# Patient Record
Sex: FEMALE | Race: WHITE | NOT HISPANIC OR LATINO
[De-identification: names, ages, dates, MRNs, and addresses within clinical notes are randomized per-mention and may not be internally consistent; named-entity substitution may affect disease eponyms.]

---
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
## 2021-08-04 PROBLEM — K58.9 IRRITABLE BOWEL SYNDROME WITHOUT DIARRHEA: Chronic | Status: ACTIVE | Noted: 2020-10-18

## 2021-08-20 ENCOUNTER — TRANSCRIPTION ENCOUNTER (OUTPATIENT)
Age: 21
End: 2021-08-20

## 2021-10-07 DIAGNOSIS — Z30.41 ENCOUNTER FOR SURVEILLANCE OF CONTRACEPTIVE PILLS: ICD-10-CM

## 2021-10-08 RX ORDER — NORETHINDRONE ACETATE AND ETHINYL ESTRADIOL 1MG-20(24)
1-20 KIT ORAL
Qty: 3 | Refills: 0 | Status: ACTIVE | COMMUNITY
Start: 2021-10-07 | End: 1900-01-01

## 2021-11-13 ENCOUNTER — APPOINTMENT (OUTPATIENT)
Dept: OBGYN | Facility: CLINIC | Age: 21
End: 2021-11-13

## 2021-12-22 ENCOUNTER — APPOINTMENT (OUTPATIENT)
Dept: OBGYN | Facility: CLINIC | Age: 21
End: 2021-12-22
Payer: COMMERCIAL

## 2021-12-22 VITALS
HEIGHT: 61 IN | SYSTOLIC BLOOD PRESSURE: 118 MMHG | DIASTOLIC BLOOD PRESSURE: 80 MMHG | BODY MASS INDEX: 20.77 KG/M2 | WEIGHT: 110 LBS

## 2021-12-22 DIAGNOSIS — Z00.00 ENCOUNTER FOR GENERAL ADULT MEDICAL EXAMINATION W/OUT ABNORMAL FINDINGS: ICD-10-CM

## 2021-12-22 PROCEDURE — 99395 PREV VISIT EST AGE 18-39: CPT

## 2021-12-22 PROCEDURE — 87081 CULTURE SCREEN ONLY: CPT

## 2021-12-22 PROCEDURE — 36415 COLL VENOUS BLD VENIPUNCTURE: CPT

## 2021-12-22 NOTE — PLAN
[FreeTextEntry1] : #HCM\par -Breast self exam reviewed and taught\par -STI Screening today with GC/CT cultures, HIV, RPR, Hep B/C\par -Pap due at 23 yo\par -Immunizations: UTD\par \par #contraception\par -Blisovi re-prescribed\par \par #h/o ovarian cysts\par -physcial exam WNL\par -Asymptomatic\par -repeat TVUS to confirm resolution. \par \par RTO in 1 year for annual GYN exam or PRN for any GYN complaints\par GWEN Carreon MD\par

## 2021-12-22 NOTE — HISTORY OF PRESENT ILLNESS
[Normal Amount/Duration] :  normal amount and duration [Regular Cycle Intervals] : periods have been regular [Frequency: Q ___ days] : menstrual periods occur approximately every [unfilled] days [Menarche Age: ____] : age at menarche was [unfilled] [No] : Patient does not have concerns regarding sex [Men] : men [Vaginal] : vaginal [Oral] : oral [Yes] : Yes [Condoms] : Condoms [Patient would like to be screened for STIs] : Patient would like to be screened for STIs [FreeTextEntry1] : 11/30/21 [FreeTextEntry3] : [facility]

## 2021-12-23 LAB
HBV SURFACE AG SER QL: NONREACTIVE
HCV AB SER QL: NONREACTIVE
HCV S/CO RATIO: 0.12 S/CO
HIV1+2 AB SPEC QL IA.RAPID: NONREACTIVE
T PALLIDUM AB SER QL IA: NEGATIVE

## 2021-12-26 LAB
C TRACH RRNA SPEC QL NAA+PROBE: NOT DETECTED
N GONORRHOEA RRNA SPEC QL NAA+PROBE: NOT DETECTED
SOURCE AMPLIFICATION: NORMAL

## 2022-01-11 NOTE — ED ADULT NURSE NOTE - NS ED NURSE LEVEL OF CONSCIOUSNESS AFFECT
Impression: Age-related nuclear cataract, bilateral: H25.13. Plan: Discussed diagnosis with patient. Cataract evaluation is recommended. Pt wishes to proceed. Informed care giver that we are unable to perform the cataract surgery here. Recommend care giver find an office where patient can be under anesthesia for surgery.
Appropriate

## 2022-02-18 NOTE — ED PROVIDER NOTE - SKIN, MLM
Pre-Hospital Care Report (PCR) Skin normal color for race, warm, dry and intact. No evidence of rash.

## 2022-08-10 ENCOUNTER — APPOINTMENT (OUTPATIENT)
Dept: ULTRASOUND IMAGING | Facility: CLINIC | Age: 22
End: 2022-08-10

## 2022-08-10 ENCOUNTER — APPOINTMENT (OUTPATIENT)
Dept: OBGYN | Facility: CLINIC | Age: 22
End: 2022-08-10

## 2022-08-10 ENCOUNTER — OUTPATIENT (OUTPATIENT)
Dept: OUTPATIENT SERVICES | Facility: HOSPITAL | Age: 22
LOS: 1 days | End: 2022-08-10
Payer: COMMERCIAL

## 2022-08-10 VITALS — SYSTOLIC BLOOD PRESSURE: 98 MMHG | HEIGHT: 61 IN | DIASTOLIC BLOOD PRESSURE: 60 MMHG

## 2022-08-10 DIAGNOSIS — R10.32 LEFT LOWER QUADRANT PAIN: ICD-10-CM

## 2022-08-10 DIAGNOSIS — R10.2 PELVIC AND PERINEAL PAIN: ICD-10-CM

## 2022-08-10 LAB — HCG UR QL: NEGATIVE

## 2022-08-10 PROCEDURE — 76830 TRANSVAGINAL US NON-OB: CPT | Mod: 26

## 2022-08-10 PROCEDURE — 76830 TRANSVAGINAL US NON-OB: CPT

## 2022-08-10 PROCEDURE — 99213 OFFICE O/P EST LOW 20 MIN: CPT | Mod: 25

## 2022-08-10 PROCEDURE — 81025 URINE PREGNANCY TEST: CPT

## 2022-08-10 NOTE — HISTORY OF PRESENT ILLNESS
[FreeTextEntry1] : 21 yo, LMP: 8/9/22. Pt has a h/o ovarian cysts. Pain started 3 days ago that feels like dull pain with. It started to feel sharp. Denies taking OTC pain medication and used topical castor oil. \par \par Office UPT neg [TextBox_4] : 2

## 2022-08-10 NOTE — PHYSICAL EXAM
[Appropriately responsive] : appropriately responsive [Alert] : alert [No Acute Distress] : no acute distress [Soft] : soft [Non-tender] : non-tender [Non-distended] : non-distended [Labia Majora] : normal [Labia Minora] : normal [Normal] : normal [Uterine Adnexae] : normal

## 2022-08-10 NOTE — PLAN
[FreeTextEntry1] : #LLQ pain\par ddx ruptured ovarian cyst vs. dysmenorrhea vs. ovarian torsion. less likely ovarian torsion given benign abdominal and pelvic exam\par will expedite TVUS at radiology location\par pt given appt for imaging.\par torsion precautions reviewed\par \par Will call pt when reports results\par YANELY Carreon MD

## 2022-08-11 ENCOUNTER — TRANSCRIPTION ENCOUNTER (OUTPATIENT)
Age: 22
End: 2022-08-11

## 2022-09-09 NOTE — ED ADULT NURSE NOTE - PLAN OF CARE
Rifampin Counseling: I discussed with the patient the risks of rifampin including but not limited to liver damage, kidney damage, red-orange body fluids, nausea/vomiting and severe allergy. Call bell/Explanation of exam/test

## 2022-11-01 RX ORDER — NORETHINDRONE ACETATE AND ETHINYL ESTRADIOL 1MG-20(24)
1-20 KIT ORAL
Qty: 3 | Refills: 0 | Status: ACTIVE | COMMUNITY
Start: 2021-12-22 | End: 1900-01-01

## 2022-12-28 ENCOUNTER — APPOINTMENT (OUTPATIENT)
Dept: OBGYN | Facility: CLINIC | Age: 22
End: 2022-12-28

## 2022-12-28 VITALS
HEIGHT: 61 IN | HEART RATE: 83 BPM | SYSTOLIC BLOOD PRESSURE: 107 MMHG | WEIGHT: 120 LBS | DIASTOLIC BLOOD PRESSURE: 72 MMHG | BODY MASS INDEX: 22.66 KG/M2

## 2022-12-28 DIAGNOSIS — Z30.9 ENCOUNTER FOR CONTRACEPTIVE MANAGEMENT, UNSPECIFIED: ICD-10-CM

## 2022-12-28 DIAGNOSIS — Z01.419 ENCOUNTER FOR GYNECOLOGICAL EXAMINATION (GENERAL) (ROUTINE) W/OUT ABNORMAL FINDINGS: ICD-10-CM

## 2022-12-28 PROCEDURE — 36415 COLL VENOUS BLD VENIPUNCTURE: CPT

## 2022-12-28 PROCEDURE — 99395 PREV VISIT EST AGE 18-39: CPT

## 2022-12-28 NOTE — PLAN
[FreeTextEntry1] : #HCM\par -Breast self exam reviewed and taught\par -STI Screening today with GC/CT cultures, HIV, RPR, Hep B/C\par -Pap today\par -Immunizations: UTD\par \par #BTB after vomiting 1 pill\par -pt advised to use 2nd form of birth control like condoms\par -reassurance given\par \par #contraception\par -refill for blisovi rx'd\par \par RTO in 1 year for annual GYN exam or PRN for any GYN complaints\par GWEN Carreon MD

## 2022-12-28 NOTE — HISTORY OF PRESENT ILLNESS
[Menarche Age: ____] : age at menarche was [unfilled] [No] : Patient does not have concerns regarding sex [Currently Active] : currently active [Patient would like to be screened for STIs] : Patient would like to be screened for STIs [FreeTextEntry1] : 12/23/22 [FreeTextEntry3] : OCPs

## 2022-12-29 LAB
C TRACH RRNA SPEC QL NAA+PROBE: NOT DETECTED
HBV SURFACE AG SER QL: NONREACTIVE
HCV AB SER QL: NONREACTIVE
HCV S/CO RATIO: 0.07 S/CO
HIV1+2 AB SPEC QL IA.RAPID: NONREACTIVE
N GONORRHOEA RRNA SPEC QL NAA+PROBE: NOT DETECTED
SOURCE TP AMPLIFICATION: NORMAL
T PALLIDUM AB SER QL IA: NEGATIVE

## 2023-01-09 ENCOUNTER — NON-APPOINTMENT (OUTPATIENT)
Age: 23
End: 2023-01-09

## 2023-12-22 NOTE — ED ADULT NURSE NOTE - ILLNESS RECENT EXPOSURE
MEDICAL ICU PROGRESS NOTE  12/22/2023      Date of Service (when I saw the patient): 12/22/2023    ASSESSMENT: Jarett Goldman is a 75 year old male admitted on 12/10/2023 who is being transferred out of the ICU on 12/13/2023. He has a history of significant for CAD, s/p CABGx3, severe HFrEF (EF 34%), ESRD on dialysis (last run one week prior to admit) who was initially admitted on 12/11 for shortness of breath after missing dialysis, during initial dialysis that she became bradycardic, hypotensive with altered mental status, requiring ICU transfer.  He required transcutaneous pacing prior to initiating epinephrine drip, now off drips since 12/12 1800, transferred out of the ICU on 12/13,was on floor awaiting TCU placement when code blue called due to acute encephalopathy and hypotension brought to the ICU for management of hypotension, shock, and further monitoring. Currently off pressors and hemodynamically stable with resolving encephalopathy. Plan for extubation today.     CHANGES and MAJOR THINGS TODAY:   - SBT eval extubation readiness- trial extubation this morning  - Cont SDS taper; hydrocortisone 50 mg daily today then off tomorrow  - SLP consult for swallow study- NPO for now  - Continue Zosyn for 5 day course  - ABG q6h after extubation     PLAN:    Neuro:  # Pain and sedation  - Acetaminophen 675 mg PO PRN   - RASS goal -1 to 0     # Acute encephalopathy- unknown etiology c/f infectious versus hypoperfusion with potential GIB- improving  # Hx CVA 2021 bilateral L>R cerebellar hemisphere, L occipital lobe (thought to be cardioembolic 2/2 ALIYAH thrombus, residual R homonymous hemianopsia)  # Hx CVA 2015 involving R cerebellar hemisphere  12/11 Minimally responsive during period of hypotension and bradycardia following one hour run of dialysis. Code stroke called, however, patient too hemodynamically unstable for CT imaging at time of event. AMS likely 2/2 cardiogenic syncope, however given hx of CVA will  obtain repeat imaging once hemodynamically stable. With improved HR and BP patient became more alert and responsive, moving all extremities with no focal neuro deficit. On 12/20, developed new encephalopathy and code blue called. Head CT and CTA Head neck w/ contrast without acute intracranial findings. Neurocrit evaluated him who said low likelihood of neurologic etiology for his encephalopathy. No seizure-like activity. Given his hypotensive pressures and c/f GID, high likelihood of hypoperfusion and/or infectious given rising leukocytosis. No known new medications or ingestions.   - Restart ASA  - Restart Eliquis      Pulmonary:  # Intubated for airway protection  Given obtunded state and concern for ability to protect his airway, intubated on 12/20. Previously on RA on the floor. Remains on ventilator settings as below. Low concern for hypoxia/respiratory etiology of his encephalopathy and CT CAP without focal pulmonary findings.   - SBT today with plans for potential extubation this morning  - ABG q6h after extubation      Vent Mode: (S) PS  (Pressure Support)  FiO2 (%): 30 %  Resp Rate (Set): 14 breaths/min  Tidal Volume (Set, mL): 440 mL  PEEP (cm H2O): 5 cmH2O  Pressure Support (cm H2O): 5 cmH2O  Resp: 12    Cardiovascular:  # Shock- likely hypovolemic versus distributive  # Lactic acidosis- resolved  On 12/19, noted to be hypotensive with MAPs in the 58-60s with improvement after  Given rising lactate and leukocytosis, would be concerned for sepsis as etiology. During intubation, BP decreased after sedation but since improved. While in the ICU, MAPs improved, but then decreased prompting starting norepi. Ddx includes distributive from sepsis versus hypovolemic from GIB versus less likely cardiogenic given improving echo.   - Off Vasopressin  - Hydrocortisone to 50 mg today; discontinue for tomorrow  - Arterial line in place  - CVC placed  - MAP goal >65     # Hx of Bradycardia  # History of CAD, ischemic  cardiomyopathy with EF 10-20%  # CAD s/p CABGx3 2015 (LIMA to LAD, SVG to OM2, R PDA)  # Hx ALIYAH thrombus  12/11 RRT during HD run escalated to Code Blue due to acute hypotension and bradycardia with HR 30s, altered mental status. Received Atropine x2 with good, but transient, response, required transcutaneous pacing and epinephrine infusion. Upon transfer to ICU, noted to lose pulse briefly requiring CPR with immediate ROSC. On 12/20, new encephalopathy and hypotension, but stat echo with improved EF to 34%, low concern for cardiogenic shock (Cardiac index 2.7).  - Cardiology: signed off, will need PPM only if more bradycardiac episodes  - Likely induced by hypervolemia, metabolic abnormalities in setting of missed HD  - restart Anticoagulation (Apixaban and ASA)  - Tele     # Troponinemia  Trop after code 287 and uptrending to 431. EKG low voltage but no signs of STEMI and echo without wall motion abnormalities. Likely demand ischemia in the setting of his encephalopathy, hypotension and hypoperfusion. Continued to uptrend but given ESRD patient, no renal clearance. Opted to stop trending given low likelihood of acute MI.      GI/Nutrition:  # Melena  # Duodenal ulcers, no active bleeding  One small melanotic stool on 12/20 with hypotension and brownish/reddish aspirate from gastric contents concerning for GIB. Hgb with 2g drop from 12/16 to 12/19 and now 1.5 gram drop this afternoon to 5.7. Multiple PIVs placed  but unfortunately continued to infiltrate. EGD performed on 12/21 with sites of duodenal ulcers but no sites of active bleeding with low likelihood of re-bleeding at this time. Recommended no post-pyloric feeding tubes if needed as ulcers are right at the bulb.     - Two large bore PIVs  - Transfuse if Hgb <7  - CBC daily   - GI consult, following               - EGD 12/22   - No post-pyloric feeding tubes   - Okay to restart AC   - PPI BID x8 weeks     # Elevated LFTs- improving  # Elevated Alkphos-  improving  # Hx of polycystic liver disease  Known history of polycystic liver disease in the past, not a transplant candidate in the past. LFTs, Alkphos, bilirubin all WNL on 12/16. Since, AST, Alkphos and bili all uptrending. Differential could include liver etiologies versus cholangitis versus acalculous cholecystitis versus acute reaction to shock. Likely acute reaction in the setting of shock versus large ascites. Will hold off on paracentesis today given low likelihood of infection at this time. RUQ US with some gallbladder wall thickening but likely in the setting of ascites. CT CAP without acute pathology.   - CMP daily     # Nutrition  - OG tube in place  - NPO while intubated  - SLP consult for swallow study  - Prior diet: Fluid restriction w/ easy to chew diet, thin liquids w/ Nepro supplement     Renal/Fluids/Electrolytes:  # ESRD on HD since 2014 2/2 PCKD  # AV fistula  Normal HD schedule T/Th/S, last dialyzed 12/5, missed two days due to feeling unwell at home with episodes of recurrent nausea, vomiting (after starting PO antibiotics for cellulitis). 12/11 HD run for about 1.5 hours prior to RRT/Code Blue. Had periods of hypotension one hour into dialysis, received 25% albumin and 150 mL bolus NS. Switched to CRRT once in ICU. On the floor, received HD Tu/Th/Sa with last run 12/21 run net even.    - Back on T/Th/S schedule  - Nephrology consulted, following     # Anion Gap Metabolic Acidosis with lactic acidosis- resolved  CO2 slowly downtrending since 12/16 to 17 on ICU admission with AG to 22 with lactic acid to 7.2 consistent with AGMA and lactic acidosis. Likely in the setting of sepsis versus hypoperfusion from hypovolemia. Lactate improved to 1.1 on evening of 12/20 and without anion gap on 12/21.   - BMP BID  - s/p 2u pRBCs, 500 ml LR     # Hypochloremia, mild  - BMP daily     Endocrine:  # Severe hypothyroidism  On admission TSH 47, T4 0.73, T3 56. 12/11 received T3 2.5 mcg IV x1, Levothyroxine  75 mcg IV, Hydrcort 100 mg.   - Endocrine consulted, appreciate recs  - Levothyroxine 100 mcg PO daily   - Repeat thyroid function 12/20 largely unchanged with free T4 decreased at 1.4. Discussed with endo, continue at 100mcg and repeat TSH, fT4 in 6 weeks (early 2/2024)    # T2DM  Admission A1c 6.1%.  - Medium dose sliding scale insulin  - Hold Lantus 10U Qam (PTA 8) while NPO  - Hypoglycemia protocol     ID:  # C/f Sepsis- unknown etiology  New hypotensive pressures with uptrending leukocytosis to 19 on ICU admission. Procal 3.35 and lactic acid up to 7.0 concerning for septic shock. This could be in the setting of ESRD. Source of infection currently unknown. Was at neurologic baseline prior to events on 12/20, less concern for meningitis as waking up while intubated. CXR without new findings. CT CAP without nidus of infection. Known left lower extremity wound with previous cellulitis but no signs of change over past several days or signs of cellulitis. MRSA nares negative. Will plan to keep abx on until BC from 12/20 48 hours with no growth. Leukocytosis has resolved.   - Abx: Empiric Zosyn for 5 day course     Hematology:    # Acute blood loss anemia in setting of chronic anemia 2/2 ESRD-improved  Hgb on 12/16 9.8 with acute drop to 7.5 on 12/19 and 5.7 on 12/20 c/f GIB. GI consulted who performed an EGD with notable duodenal ulcers, but no active bleeding, low concern for re-bleeding from these sites. Hgb stable since transfusion and EGD.   - CBC daily  - Transfuse if Hgb <7      Musculoskeletal:  # Physical deconditioning  - PT/OT following     Skin:  # Left ankle wound w/ hx of left lower leg cellulitis  Diagnosed with cellulitis around 12/4 and was on cefazolin from 12/11-12/14. Wound team following with no changes since discontinuing abx.   - CTM  - WOC following     General Cares/Prophylaxis:    DVT Prophylaxis: Eliquis  GI Prophylaxis: PPI IV BID  Restraints: Mitsalomon  Family Communication: Updated friend,  "Jose via phone  Code Status: Full      Lines/tubes/drains:  - PIV  - CVC  - Arterial line  - ETT    Disposition:  - Medical ICU    Patient seen and findings/plan discussed with medical ICU staff, Dr. Brown.    John Ariza MD  Medicine-Pediatrics Resident, PGY1    Clinically Significant Risk Factors        # Hyperkalemia: Highest K = 5.6 mmol/L in last 2 days, will monitor as appropriate    # Hypercalcemia: corrected calcium is >10.1, will monitor as appropriate    # Hypoalbuminemia: Lowest albumin = 2.7 g/dL at 12/20/2023  6:58 PM, will monitor as appropriate    # Coagulation Defect: INR = 2.05 (Ref range: 0.85 - 1.15) and/or PTT = 54 Seconds (Ref range: 22 - 38 Seconds), will monitor for bleeding    # Hypertension: Noted on problem list  # Chronic heart failure with reduced ejection fraction: last echo with EF <40%       # Overweight: Estimated body mass index is 26.26 kg/m  as calculated from the following:    Height as of this encounter: 1.676 m (5' 6\").    Weight as of this encounter: 73.8 kg (162 lb 11.2 oz).        # Financial/Environmental Concerns: none   # History of CABG: noted on surgical history            ====================================  INTERVAL HISTORY:   NAEON. No other signs of bleeding or melanotic stools. HD run yesterday at [facility] even. Remained on vasopressin this morning but able to wean off by 10:00 am. No pain at this time. No other concerns.     OBJECTIVE:   1. VITAL SIGNS:   Temp:  [96.6  F (35.9  C)-97.8  F (36.6  C)] 97.3  F (36.3  C)  Pulse:  [70-94] 82  Resp:  [12-23] 12  BP: (114-128)/(50-58) 128/58  MAP:  [63 mmHg-88 mmHg] 76 mmHg  Arterial Line BP: ()/(44-61) 120/50  FiO2 (%):  [30 %] 30 %  SpO2:  [100 %] 100 %  Vent Mode: (S) PS  (Pressure Support)  FiO2 (%): 30 %  Resp Rate (Set): 14 breaths/min  Tidal Volume (Set, mL): 440 mL  PEEP (cm H2O): 5 cmH2O  Pressure Support (cm H2O): 5 cmH2O  Resp: 12    2. INTAKE/ OUTPUT:   I/O last 3 completed shifts:  In: 907.4 " [I.V.:857.4; NG/GT:50]  Out: 25 [Emesis/NG output:25]    3. PHYSICAL EXAMINATION:  General: Intubated but awake, following commands. Less pallor.   HEENT: ETT in place.   Neuro: Intubated and following commands, voicing understanding of situation. spontaneous movements in all four extremities. 5/5 Strength in UE and LE.   Pulm/Resp: Clear breath sounds bilaterally without rhonchi, crackles or wheeze, breathing non-labored.   CV:  RRR, no murmurs appreciated.   Abdomen: Soft, non-tender, distended.   Ext: No LE edema, peripheral pulses weak but intact.   Incisions/Skin: Left ankle with bandage over wound. No surrounding erythema, warmth, or blanching. Scattered ecchymosis on abdomen around injection sites.     4. LABS:   Arterial Blood Gases   Recent Labs   Lab 12/20/23  1715   PH 7.45   PCO2 35   PO2 205*   HCO3 24     Complete Blood Count   Recent Labs   Lab 12/22/23  0404 12/21/23  1641 12/21/23  0805 12/21/23  0427 12/20/23  1354 12/20/23  0809 12/20/23  0632   WBC 14.3*  --   --  11.0  --  19.1* 16.7*   HGB 8.4* 8.4* 8.4* 8.3*   < > 7.3* 7.1*     --   --  183  --  212 249    < > = values in this interval not displayed.     Basic Metabolic Panel  Recent Labs   Lab 12/22/23  0755 12/22/23  0406 12/22/23  0404 12/22/23  0103 12/21/23  0429 12/21/23  0427 12/20/23  2025 12/20/23  1858 12/20/23  1717   NA  --   --  133*  --   --  132*  --  131* 130*   POTASSIUM  --   --  4.8  --   --  5.6*  --  5.2 5.3   CHLORIDE  --   --  96*  --   --  97*  --  98 96*   CO2  --   --  21*  --   --  20*  --  22 22   BUN  --   --  54.5*  --   --  73.1*  --  65.7* 64.4*   CR  --   --  2.60*  --   --  3.28*  --  3.01* 2.92*   * 195* 193* 187*   < > 173*   < > 169* 167*    < > = values in this interval not displayed.     Liver Function Tests  Recent Labs   Lab 12/22/23  0404 12/21/23  0427 12/20/23  1858 12/20/23  1717 12/20/23  1656   AST 42 62* 67* 69*  --    ALT 7 8 7 <5  --    ALKPHOS 173* 208* 233* 238*  --    BILITOTAL  1.1 1.1 1.0 1.1  --    ALBUMIN 3.1* 2.8* 2.7* 2.8*  --    INR  --   --   --   --  2.05*     Coagulation Profile  Recent Labs   Lab 12/20/23  1656   INR 2.05*   PTT 54*       5. RADIOLOGY:   Recent Results (from the past 24 hour(s))   XR Abdomen Port 1 View    Narrative    EXAMINATION:  XR ABDOMEN PORT 1 VIEW 12/21/2023     COMPARISON: Abdominal radiograph 12/20/2023.    HISTORY: Confirm gastric tube placement.    TECHNIQUE: Frontal supine view of the abdomen.    FINDINGS: AP portable supine radiograph of the abdomen. Enteric tube  traversing into the lower abdomen with the tip and sidehole overlying  the area of the stomach. No abnormally dilated loops of bowel, free  air, or pneumatosis in the visualized abdomen. No portal venous gas.   The lung bases are unremarkable. Sternotomy wires are intact.      Impression    IMPRESSION: Enteric tube with the tip and side-port overlying the area  of the stomach.    I have personally reviewed the examination and initial interpretation  and I agree with the findings.    DENIS PERRIN MD         SYSTEM ID:  [id number]       None known

## 2024-02-26 ENCOUNTER — RX RENEWAL (OUTPATIENT)
Age: 24
End: 2024-02-26

## 2024-05-17 ENCOUNTER — RX RENEWAL (OUTPATIENT)
Age: 24
End: 2024-05-17

## 2024-05-17 RX ORDER — NORETHINDRONE ACETATE AND ETHINYL ESTRADIOL 1MG-20(24)
1-20 KIT ORAL
Qty: 84 | Refills: 0 | Status: ACTIVE | COMMUNITY
Start: 2022-12-28 | End: 1900-01-01

## 2024-10-01 ENCOUNTER — NON-APPOINTMENT (OUTPATIENT)
Age: 24
End: 2024-10-01

## 2024-10-22 ENCOUNTER — APPOINTMENT (OUTPATIENT)
Dept: OBGYN | Facility: CLINIC | Age: 24
End: 2024-10-22

## 2024-11-06 ENCOUNTER — NON-APPOINTMENT (OUTPATIENT)
Age: 24
End: 2024-11-06

## 2024-11-06 ENCOUNTER — APPOINTMENT (OUTPATIENT)
Dept: OBGYN | Facility: CLINIC | Age: 24
End: 2024-11-06
Payer: COMMERCIAL

## 2024-11-06 VITALS
SYSTOLIC BLOOD PRESSURE: 124 MMHG | BODY MASS INDEX: 22.66 KG/M2 | DIASTOLIC BLOOD PRESSURE: 87 MMHG | WEIGHT: 120 LBS | HEIGHT: 61 IN

## 2024-11-06 PROCEDURE — 99395 PREV VISIT EST AGE 18-39: CPT

## 2024-11-07 LAB
C TRACH RRNA SPEC QL NAA+PROBE: NOT DETECTED
HBV SURFACE AG SER QL: NONREACTIVE
HCV AB SER QL: NONREACTIVE
HCV S/CO RATIO: 0.09 S/CO
HIV1+2 AB SPEC QL IA.RAPID: NONREACTIVE
N GONORRHOEA RRNA SPEC QL NAA+PROBE: NOT DETECTED
SOURCE AMPLIFICATION: NORMAL
T PALLIDUM AB SER QL IA: NEGATIVE

## 2024-11-12 DIAGNOSIS — B37.9 CANDIDIASIS, UNSPECIFIED: ICD-10-CM

## 2024-11-12 RX ORDER — TERCONAZOLE 8 MG/G
0.8 CREAM VAGINAL
Qty: 1 | Refills: 0 | Status: ACTIVE | COMMUNITY
Start: 2024-11-12 | End: 1900-01-01

## 2024-12-12 ENCOUNTER — RX RENEWAL (OUTPATIENT)
Age: 24
End: 2024-12-12

## 2025-03-04 ENCOUNTER — RX RENEWAL (OUTPATIENT)
Age: 25
End: 2025-03-04

## 2025-03-31 ENCOUNTER — NON-APPOINTMENT (OUTPATIENT)
Age: 25
End: 2025-03-31

## 2025-03-31 ENCOUNTER — APPOINTMENT (OUTPATIENT)
Dept: FAMILY MEDICINE | Facility: CLINIC | Age: 25
End: 2025-03-31
Payer: COMMERCIAL

## 2025-03-31 VITALS
WEIGHT: 127 LBS | OXYGEN SATURATION: 100 % | HEIGHT: 61 IN | RESPIRATION RATE: 14 BRPM | TEMPERATURE: 97.4 F | SYSTOLIC BLOOD PRESSURE: 109 MMHG | HEART RATE: 66 BPM | DIASTOLIC BLOOD PRESSURE: 76 MMHG | BODY MASS INDEX: 23.98 KG/M2

## 2025-03-31 DIAGNOSIS — Z30.41 ENCOUNTER FOR SURVEILLANCE OF CONTRACEPTIVE PILLS: ICD-10-CM

## 2025-03-31 DIAGNOSIS — E55.9 VITAMIN D DEFICIENCY, UNSPECIFIED: ICD-10-CM

## 2025-03-31 DIAGNOSIS — B37.9 CANDIDIASIS, UNSPECIFIED: ICD-10-CM

## 2025-03-31 DIAGNOSIS — Z83.438 FAMILY HISTORY OF OTHER DISORDER OF LIPOPROTEIN METABOLISM AND OTHER LIPIDEMIA: ICD-10-CM

## 2025-03-31 DIAGNOSIS — R10.2 PELVIC AND PERINEAL PAIN: ICD-10-CM

## 2025-03-31 DIAGNOSIS — Z00.00 ENCOUNTER FOR GENERAL ADULT MEDICAL EXAMINATION W/OUT ABNORMAL FINDINGS: ICD-10-CM

## 2025-03-31 DIAGNOSIS — R10.32 LEFT LOWER QUADRANT PAIN: ICD-10-CM

## 2025-03-31 DIAGNOSIS — Z86.59 PERSONAL HISTORY OF OTHER MENTAL AND BEHAVIORAL DISORDERS: ICD-10-CM

## 2025-03-31 DIAGNOSIS — R53.83 OTHER FATIGUE: ICD-10-CM

## 2025-03-31 PROCEDURE — 93000 ELECTROCARDIOGRAM COMPLETE: CPT

## 2025-03-31 PROCEDURE — 99385 PREV VISIT NEW AGE 18-39: CPT

## 2025-03-31 PROCEDURE — 36415 COLL VENOUS BLD VENIPUNCTURE: CPT

## 2025-03-31 RX ORDER — LAMOTRIGINE 150 MG/1
TABLET ORAL
Refills: 0 | Status: ACTIVE | COMMUNITY

## 2025-03-31 RX ORDER — MAGNESIUM OXIDE/MAG AA CHELATE 300 MG
CAPSULE ORAL
Refills: 0 | Status: ACTIVE | COMMUNITY

## 2025-04-01 LAB
25(OH)D3 SERPL-MCNC: 79.9 NG/ML
ALBUMIN SERPL ELPH-MCNC: 4.5 G/DL
ALP BLD-CCNC: 48 U/L
ALT SERPL-CCNC: 14 U/L
ANION GAP SERPL CALC-SCNC: 14 MMOL/L
APPEARANCE: CLEAR
AST SERPL-CCNC: 18 U/L
BASOPHILS # BLD AUTO: 0.04 K/UL
BASOPHILS NFR BLD AUTO: 0.7 %
BILIRUB SERPL-MCNC: 0.4 MG/DL
BILIRUBIN URINE: NEGATIVE
BLOOD URINE: NEGATIVE
BUN SERPL-MCNC: 13 MG/DL
CALCIUM SERPL-MCNC: 9.6 MG/DL
CHLORIDE SERPL-SCNC: 104 MMOL/L
CHOLEST SERPL-MCNC: 141 MG/DL
CO2 SERPL-SCNC: 21 MMOL/L
COLOR: YELLOW
CREAT SERPL-MCNC: 0.84 MG/DL
EGFRCR SERPLBLD CKD-EPI 2021: 99 ML/MIN/1.73M2
EOSINOPHIL # BLD AUTO: 0.07 K/UL
EOSINOPHIL NFR BLD AUTO: 1.2 %
ESTIMATED AVERAGE GLUCOSE: 97 MG/DL
FOLATE SERPL-MCNC: 18.3 NG/ML
GLUCOSE QUALITATIVE U: NEGATIVE MG/DL
GLUCOSE SERPL-MCNC: 77 MG/DL
HBA1C MFR BLD HPLC: 5 %
HCG SERPL-MCNC: <1 MIU/ML
HCT VFR BLD CALC: 46.6 %
HDLC SERPL-MCNC: 56 MG/DL
HGB BLD-MCNC: 14.6 G/DL
IMM GRANULOCYTES NFR BLD AUTO: 0.2 %
KETONES URINE: NEGATIVE MG/DL
LDLC SERPL-MCNC: 68 MG/DL
LEUKOCYTE ESTERASE URINE: NEGATIVE
LYMPHOCYTES # BLD AUTO: 1.89 K/UL
LYMPHOCYTES NFR BLD AUTO: 31.8 %
MAN DIFF?: NORMAL
MCHC RBC-ENTMCNC: 28 PG
MCHC RBC-ENTMCNC: 31.3 G/DL
MCV RBC AUTO: 89.4 FL
MONOCYTES # BLD AUTO: 0.34 K/UL
MONOCYTES NFR BLD AUTO: 5.7 %
NEUTROPHILS # BLD AUTO: 3.6 K/UL
NEUTROPHILS NFR BLD AUTO: 60.4 %
NITRITE URINE: NEGATIVE
NONHDLC SERPL-MCNC: 85 MG/DL
PH URINE: 7
PLATELET # BLD AUTO: 192 K/UL
POTASSIUM SERPL-SCNC: 4.9 MMOL/L
PROT SERPL-MCNC: 7.2 G/DL
PROTEIN URINE: NEGATIVE MG/DL
RBC # BLD: 5.21 M/UL
RBC # FLD: 12.9 %
SODIUM SERPL-SCNC: 138 MMOL/L
SPECIFIC GRAVITY URINE: 1.01
TRIGL SERPL-MCNC: 93 MG/DL
TSH SERPL-ACNC: 0.8 UIU/ML
UROBILINOGEN URINE: 0.2 MG/DL
VIT B12 SERPL-MCNC: 646 PG/ML
WBC # FLD AUTO: 5.95 K/UL

## 2025-05-29 DIAGNOSIS — R79.9 ABNORMAL FINDING OF BLOOD CHEMISTRY, UNSPECIFIED: ICD-10-CM

## 2025-06-09 ENCOUNTER — ASOB RESULT (OUTPATIENT)
Age: 25
End: 2025-06-09

## 2025-06-09 ENCOUNTER — APPOINTMENT (OUTPATIENT)
Dept: OBGYN | Facility: CLINIC | Age: 25
End: 2025-06-09
Payer: COMMERCIAL

## 2025-06-09 PROCEDURE — 76830 TRANSVAGINAL US NON-OB: CPT

## 2025-06-09 PROCEDURE — 99213 OFFICE O/P EST LOW 20 MIN: CPT
